# Patient Record
Sex: MALE | Race: WHITE | HISPANIC OR LATINO | Employment: STUDENT | URBAN - METROPOLITAN AREA
[De-identification: names, ages, dates, MRNs, and addresses within clinical notes are randomized per-mention and may not be internally consistent; named-entity substitution may affect disease eponyms.]

---

## 2022-08-10 ENCOUNTER — OFFICE VISIT (OUTPATIENT)
Dept: URGENT CARE | Facility: PHYSICIAN GROUP | Age: 22
End: 2022-08-10
Payer: COMMERCIAL

## 2022-08-10 ENCOUNTER — HOSPITAL ENCOUNTER (OUTPATIENT)
Dept: RADIOLOGY | Facility: MEDICAL CENTER | Age: 22
End: 2022-08-10
Attending: NURSE PRACTITIONER
Payer: COMMERCIAL

## 2022-08-10 VITALS
OXYGEN SATURATION: 96 % | SYSTOLIC BLOOD PRESSURE: 102 MMHG | HEART RATE: 68 BPM | RESPIRATION RATE: 12 BRPM | TEMPERATURE: 98 F | DIASTOLIC BLOOD PRESSURE: 66 MMHG | WEIGHT: 165 LBS | BODY MASS INDEX: 23.62 KG/M2 | HEIGHT: 70 IN

## 2022-08-10 DIAGNOSIS — S63.617A SPRAIN OF LEFT LITTLE FINGER, UNSPECIFIED SITE OF DIGIT, INITIAL ENCOUNTER: ICD-10-CM

## 2022-08-10 PROCEDURE — 73140 X-RAY EXAM OF FINGER(S): CPT | Mod: LT

## 2022-08-10 PROCEDURE — 99203 OFFICE O/P NEW LOW 30 MIN: CPT | Performed by: NURSE PRACTITIONER

## 2022-08-10 NOTE — PROGRESS NOTES
Chief Complaint   Patient presents with    Follow-Up     Left hand pinky, still swollen and got gareth all the way        HISTORY OF PRESENT ILLNESS: Patient is a pleasant 22 y.o. male who presents to urgent care today with concerns of a pinky injury.  Notes he jammed the finger while playing basketball 3 weeks ago.  Since then he has had pain and swelling, primarily to middle phalanx, DIP, and PIP area.  Pain exacerbated with range of motion.  Denies any numbness or tingling.  He is right-hand dominant.  He has tried using a splint for treatment.     There are no problems to display for this patient.      Allergies:Patient has no known allergies.    No current Epic-ordered outpatient medications on file.     No current [facility]-ordered facility-administered medications on file.       Past Medical History:   Diagnosis Date    No known health problems        Social History     Tobacco Use    Smoking status: Never    Smokeless tobacco: Never   Substance Use Topics    Alcohol use: No    Drug use: No       No family status information on file.   History reviewed. No pertinent family history.    ROS:  Review of Systems   Constitutional: Negative for fever, chills, weight loss, malaise, and fatigue.   HENT: Negative for ear pain, nosebleeds, congestion, sore throat and neck pain.    Eyes: Negative for vision changes.   Neuro: Negative for headache, sensory changes, weakness, seizure, LOC.   Cardiovascular: Negative for chest pain, palpitations, orthopnea and leg swelling.   Respiratory: Negative for cough, sputum production, shortness of breath and wheezing.   Gastrointestinal: Negative for abdominal pain, nausea, vomiting or diarrhea.   Genitourinary: Negative for dysuria, urgency and frequency.  Musculoskeletal: Positive for finger injury.  Negative for falls, neck pain, back pain, myalgias.   Skin: Negative for rash, diaphoresis.     Exam:  /66 (BP Location: Right arm, Patient Position: Sitting, BP Cuff Size: Adult)    "Pulse 68   Temp 36.7 °C (98 °F) (Temporal)   Resp 12   Ht 1.778 m (5' 10\")   Wt 74.8 kg (165 lb)   SpO2 96%   General: well-nourished, well-developed male in NAD  Head: normocephalic, atraumatic  Eyes: PERRLA, no conjunctival injection, acuity grossly intact, lids normal.  Ears: normal shape and symmetry, no tenderness, no discharge. External canals are without any significant edema or erythema. Tympanic membranes are without any inflammation, no effusion. Gross auditory acuity is intact.  Nose: symmetrical without tenderness, no discharge.  Mouth/Throat: reasonable hygiene, no erythema, exudates or tonsillar enlargement.  Neck: no masses, range of motion within normal limits, no tracheal deviation. No obvious thyroid enlargement.   Lymph: no cervical adenopathy. No supraclavicular adenopathy.   Neuro: alert and oriented. Cranial nerves 1-12 grossly intact. No sensory deficit.   Cardiovascular: regular rate and rhythm. No edema.  Pulmonary: no distress. Chest is symmetrical with respiration, no wheezes, crackles, or rhonchi.   Musculoskeletal: no clubbing, appropriate muscle tone, gait is stable.  Left fifth digit: Tenderness over middle phalanx.  Swelling noted over DIP and PIP.  Pain with flexion.  Neurovascular intact.  Skin: warm, dry, intact, no clubbing, no cyanosis, no rashes.   Psych: appropriate mood, affect, judgement.       DX finger radiology readin.  No acute displaced fracture is seen.  2.  Small curvilinear density at the volar aspect of the 5th PIP joint calcific density within tendinous or ligament attachment or potentially subacute bony injury. Origin is uncertain.        Assessment/Plan:  1. Sprain of left little finger, unspecified site of digit, initial encounter  DX-FINGER(S) 2+ LEFT            Patient presents with finger injury. X-ray abnormal with possible fracture and/or ligament injury. Patient placed in aluminum splint. OTC NSAIDS, RICE. Urgent referral placed to hand for follow " up.   Supportive care, differential diagnoses, and indications for immediate follow-up discussed with patient.   Pathogenesis of diagnosis discussed including typical length and natural progression.   Instructed to return to clinic or nearest emergency department for any change in condition, further concerns, or worsening of symptoms.  Patient states understanding of the plan of care and discharge instructions.  Instructed to make an appointment, for follow up, with his primary care provider.        Please note that this dictation was created using voice recognition software. I have made every reasonable attempt to correct obvious errors, but I expect that there are errors of grammar and possibly content that I did not discover before finalizing the note. Patient seen and evaluated by both myself and APRN student Coreen Ramírez.         AARON Rosas.

## 2022-08-24 PROBLEM — M25.642 STIFFNESS OF FINGER JOINT OF LEFT HAND: Status: ACTIVE | Noted: 2022-08-24

## 2022-08-24 PROBLEM — S63.637A SPRAIN OF INTERPHALANGEAL JOINT OF LEFT LITTLE FINGER: Status: ACTIVE | Noted: 2022-08-24

## 2024-03-18 ENCOUNTER — APPOINTMENT (OUTPATIENT)
Dept: RADIOLOGY | Facility: MEDICAL CENTER | Age: 24
End: 2024-03-18
Attending: EMERGENCY MEDICINE
Payer: COMMERCIAL

## 2024-03-18 ENCOUNTER — HOSPITAL ENCOUNTER (EMERGENCY)
Facility: MEDICAL CENTER | Age: 24
End: 2024-03-18
Attending: EMERGENCY MEDICINE
Payer: COMMERCIAL

## 2024-03-18 VITALS
BODY MASS INDEX: 23.62 KG/M2 | RESPIRATION RATE: 12 BRPM | HEART RATE: 87 BPM | OXYGEN SATURATION: 98 % | HEIGHT: 70 IN | WEIGHT: 165 LBS | DIASTOLIC BLOOD PRESSURE: 77 MMHG | TEMPERATURE: 98 F | SYSTOLIC BLOOD PRESSURE: 134 MMHG

## 2024-03-18 DIAGNOSIS — R07.81 RIB PAIN: ICD-10-CM

## 2024-03-18 DIAGNOSIS — T07.XXXA MULTIPLE ABRASIONS: ICD-10-CM

## 2024-03-18 DIAGNOSIS — R10.11 RUQ PAIN: ICD-10-CM

## 2024-03-18 DIAGNOSIS — R07.89 CHEST WALL PAIN: ICD-10-CM
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
## 2024-03-18 LAB
ABO + RH BLD: NORMAL
ABO GROUP BLD: NORMAL
ALBUMIN SERPL BCP-MCNC: 5.1 G/DL (ref 3.2–4.9)
ALBUMIN/GLOB SERPL: 1.8 G/DL
ALP SERPL-CCNC: 122 U/L (ref 30–99)
ALT SERPL-CCNC: 116 U/L (ref 2–50)
ANION GAP SERPL CALC-SCNC: 13 MMOL/L (ref 7–16)
APTT PPP: 25.2 SEC (ref 24.7–36)
AST SERPL-CCNC: 163 U/L (ref 12–45)
BILIRUB SERPL-MCNC: 0.4 MG/DL (ref 0.1–1.5)
BLD GP AB SCN SERPL QL: NORMAL
BUN SERPL-MCNC: 24 MG/DL (ref 8–22)
CALCIUM ALBUM COR SERPL-MCNC: 8.7 MG/DL (ref 8.5–10.5)
CALCIUM SERPL-MCNC: 9.6 MG/DL (ref 8.5–10.5)
CHLORIDE SERPL-SCNC: 103 MMOL/L (ref 96–112)
CO2 SERPL-SCNC: 23 MMOL/L (ref 20–33)
CREAT SERPL-MCNC: 1.14 MG/DL (ref 0.5–1.4)
ERYTHROCYTE [DISTWIDTH] IN BLOOD BY AUTOMATED COUNT: 39.6 FL (ref 35.9–50)
ETHANOL BLD-MCNC: <10.1 MG/DL
GFR SERPLBLD CREATININE-BSD FMLA CKD-EPI: 91 ML/MIN/1.73 M 2
GLOBULIN SER CALC-MCNC: 2.9 G/DL (ref 1.9–3.5)
GLUCOSE SERPL-MCNC: 107 MG/DL (ref 65–99)
HCG SERPL QL: NEGATIVE
HCT VFR BLD AUTO: 47.4 % (ref 42–52)
HGB BLD-MCNC: 17 G/DL (ref 14–18)
INR PPP: 1 (ref 0.87–1.13)
MCH RBC QN AUTO: 31.7 PG (ref 27–33)
MCHC RBC AUTO-ENTMCNC: 35.9 G/DL (ref 32.3–36.5)
MCV RBC AUTO: 88.4 FL (ref 81.4–97.8)
PLATELET # BLD AUTO: 243 K/UL (ref 164–446)
PMV BLD AUTO: 10.2 FL (ref 9–12.9)
POTASSIUM SERPL-SCNC: 3.8 MMOL/L (ref 3.6–5.5)
PROT SERPL-MCNC: 8 G/DL (ref 6–8.2)
PROTHROMBIN TIME: 13.3 SEC (ref 12–14.6)
RBC # BLD AUTO: 5.36 M/UL (ref 4.7–6.1)
RH BLD: NORMAL
SODIUM SERPL-SCNC: 139 MMOL/L (ref 135–145)
WBC # BLD AUTO: 13.7 K/UL (ref 4.8–10.8)

## 2024-03-18 PROCEDURE — 82077 ASSAY SPEC XCP UR&BREATH IA: CPT

## 2024-03-18 PROCEDURE — 86900 BLOOD TYPING SEROLOGIC ABO: CPT

## 2024-03-18 PROCEDURE — 80053 COMPREHEN METABOLIC PANEL: CPT

## 2024-03-18 PROCEDURE — 86901 BLOOD TYPING SEROLOGIC RH(D): CPT

## 2024-03-18 PROCEDURE — 73030 X-RAY EXAM OF SHOULDER: CPT | Mod: LT

## 2024-03-18 PROCEDURE — 90471 IMMUNIZATION ADMIN: CPT

## 2024-03-18 PROCEDURE — 700102 HCHG RX REV CODE 250 W/ 637 OVERRIDE(OP): Performed by: EMERGENCY MEDICINE

## 2024-03-18 PROCEDURE — 90715 TDAP VACCINE 7 YRS/> IM: CPT | Performed by: EMERGENCY MEDICINE

## 2024-03-18 PROCEDURE — 99284 EMERGENCY DEPT VISIT MOD MDM: CPT

## 2024-03-18 PROCEDURE — 85610 PROTHROMBIN TIME: CPT

## 2024-03-18 PROCEDURE — 36415 COLL VENOUS BLD VENIPUNCTURE: CPT

## 2024-03-18 PROCEDURE — 307740 HCHG GREEN TRAUMA TEAM SERVICES

## 2024-03-18 PROCEDURE — 85730 THROMBOPLASTIN TIME PARTIAL: CPT

## 2024-03-18 PROCEDURE — 71045 X-RAY EXAM CHEST 1 VIEW: CPT

## 2024-03-18 PROCEDURE — 85027 COMPLETE CBC AUTOMATED: CPT

## 2024-03-18 PROCEDURE — A9270 NON-COVERED ITEM OR SERVICE: HCPCS | Performed by: EMERGENCY MEDICINE

## 2024-03-18 PROCEDURE — 700117 HCHG RX CONTRAST REV CODE 255: Performed by: EMERGENCY MEDICINE

## 2024-03-18 PROCEDURE — 700111 HCHG RX REV CODE 636 W/ 250 OVERRIDE (IP): Performed by: EMERGENCY MEDICINE

## 2024-03-18 PROCEDURE — 74177 CT ABD & PELVIS W/CONTRAST: CPT

## 2024-03-18 PROCEDURE — 86850 RBC ANTIBODY SCREEN: CPT

## 2024-03-18 PROCEDURE — 84703 CHORIONIC GONADOTROPIN ASSAY: CPT

## 2024-03-18 RX ORDER — ACETAMINOPHEN 500 MG
1000 TABLET ORAL ONCE
Status: COMPLETED | OUTPATIENT
Start: 2024-03-18 | End: 2024-03-18

## 2024-03-18 RX ADMIN — IOHEXOL 95 ML: 350 INJECTION, SOLUTION INTRAVENOUS at 01:15

## 2024-03-18 RX ADMIN — CLOSTRIDIUM TETANI TOXOID ANTIGEN (FORMALDEHYDE INACTIVATED), CORYNEBACTERIUM DIPHTHERIAE TOXOID ANTIGEN (FORMALDEHYDE INACTIVATED), BORDETELLA PERTUSSIS TOXOID ANTIGEN (GLUTARALDEHYDE INACTIVATED), BORDETELLA PERTUSSIS FILAMENTOUS HEMAGGLUTININ ANTIGEN (FORMALDEHYDE INACTIVATED), BORDETELLA PERTUSSIS PERTACTIN ANTIGEN, AND BORDETELLA PERTUSSIS FIMBRIAE 2/3 ANTIGEN 0.5 ML: 5; 2; 2.5; 5; 3; 5 INJECTION, SUSPENSION INTRAMUSCULAR at 01:39

## 2024-03-18 RX ADMIN — ACETAMINOPHEN 1000 MG: 500 TABLET, FILM COATED ORAL at 01:38

## 2024-03-18 ASSESSMENT — PAIN DESCRIPTION - PAIN TYPE
TYPE: ACUTE PAIN
TYPE: ACUTE PAIN

## 2024-03-18 NOTE — ED NOTES
Bedside report received from off going RN/tech: Yanelis, assumed care of patient.  POC discussed with patient. Call light within reach, all needs addressed at this time.       Fall risk interventions in place: Move the patient closer to the nurse's station, Patient's personal possessions are with in their safe reach, Place fall risk sign on patient's door, and Keep floor surfaces clean and dry (all applicable per [place] Fall risk assessment)   Continuous monitoring: Pulse Ox or Blood Pressure  IVF/IV medications: Not Applicable   Oxygen: Room Air  Bedside sitter: Not Applicable   Isolation: Not Applicable
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
## 2024-05-07 ENCOUNTER — HOSPITAL ENCOUNTER (EMERGENCY)
Facility: MEDICAL CENTER | Age: 24
End: 2024-05-07
Attending: EMERGENCY MEDICINE
Payer: COMMERCIAL

## 2024-05-07 VITALS
HEART RATE: 66 BPM | DIASTOLIC BLOOD PRESSURE: 63 MMHG | OXYGEN SATURATION: 99 % | RESPIRATION RATE: 20 BRPM | HEIGHT: 70 IN | SYSTOLIC BLOOD PRESSURE: 127 MMHG | WEIGHT: 182.32 LBS | BODY MASS INDEX: 26.1 KG/M2 | TEMPERATURE: 97.6 F

## 2024-05-07 DIAGNOSIS — N34.2 URETHRITIS: ICD-10-CM

## 2024-05-07 LAB
APPEARANCE UR: CLEAR
BACTERIA #/AREA URNS HPF: NEGATIVE /HPF
BILIRUB UR QL STRIP.AUTO: NEGATIVE
COLOR UR: YELLOW
EPI CELLS #/AREA URNS HPF: NEGATIVE /HPF
GLUCOSE UR STRIP.AUTO-MCNC: NEGATIVE MG/DL
HYALINE CASTS #/AREA URNS LPF: ABNORMAL /LPF
KETONES UR STRIP.AUTO-MCNC: NEGATIVE MG/DL
LEUKOCYTE ESTERASE UR QL STRIP.AUTO: ABNORMAL
MICRO URNS: ABNORMAL
NITRITE UR QL STRIP.AUTO: NEGATIVE
PH UR STRIP.AUTO: 8 [PH] (ref 5–8)
PROT UR QL STRIP: NEGATIVE MG/DL
RBC # URNS HPF: ABNORMAL /HPF
RBC UR QL AUTO: ABNORMAL
SP GR UR STRIP.AUTO: 1.01
UROBILINOGEN UR STRIP.AUTO-MCNC: 0.2 MG/DL
WBC #/AREA URNS HPF: ABNORMAL /HPF

## 2024-05-07 RX ORDER — DOXYCYCLINE 100 MG/1
100 TABLET ORAL ONCE
Status: COMPLETED | OUTPATIENT
Start: 2024-05-07 | End: 2024-05-07

## 2024-05-07 RX ORDER — DOXYCYCLINE 100 MG/1
100 CAPSULE ORAL 2 TIMES DAILY
Qty: 20 CAPSULE | Refills: 0 | Status: ACTIVE | OUTPATIENT
Start: 2024-05-07 | End: 2024-05-17

## 2024-05-07 RX ORDER — CEFTRIAXONE 500 MG/1
500 INJECTION, POWDER, FOR SOLUTION INTRAMUSCULAR; INTRAVENOUS ONCE
Status: COMPLETED | OUTPATIENT
Start: 2024-05-07 | End: 2024-05-07

## 2024-05-07 RX ADMIN — DOXYCYCLINE 100 MG: 100 TABLET, FILM COATED ORAL at 11:02

## 2024-05-07 RX ADMIN — LIDOCAINE HYDROCHLORIDE 2 ML: 10 INJECTION, SOLUTION EPIDURAL; INFILTRATION; INTRACAUDAL at 11:02

## 2024-05-07 RX ADMIN — CEFTRIAXONE SODIUM 500 MG: 500 INJECTION, POWDER, FOR SOLUTION INTRAMUSCULAR; INTRAVENOUS at 11:02

## 2024-05-07 ASSESSMENT — FIBROSIS 4 INDEX: FIB4 SCORE: 1.56

## 2024-05-07 NOTE — LETTER
5/11/2024               Ivan Ortega  [id number]  [facility] [address]        Dear Ivan (MR#[id number])    As we have been unable to contact you by phone, this letter is sent in regards to your, recent visit to the [facility] Emergency Department on 5/7/2024. During the visit, tests were performed to assist the physician in your medical diagnosis. A review of your tests requires that we notify you of the following:    Your culture test was POSITIVE for Gonorrhea, a sexually transmitted infection. This was already treated appropriately in the Emergency Department. .      Based on the above findings it is recommended that you seek testing for the presence of additional sexually transmitted infections, hepatitis, and HIV from the Health Department. Also, it is advised that you inform your sexual partner(s) within the previous 60 days of the above findings and direct them to the Health Department for testing, and to abstain from sexual intercourse seven days after the completion of antibiotic treatment. Should your symptoms progress, it is important that you follow up with your primary care physician, your local urgent care office, or return to the emergency department for further work up in order to prevent long term health issues.      Additionally, patients who are HIV negative and have been diagnosed with a sexually transmitted infection (STI) in the past 6 months are considered for PrEP.  PrEP stands for Pre-Exposure Prophylaxis. It is a once-daily pill regimen that can help you stay HIV-negative. When taken as prescribed, PrEP has been shown to be safe and highly effective against alex HIV. While PrEP does not protect against other sexually transmitted infections or unwanted pregnancy, it can be paired with condoms and several other prevention strategies for additional protection. We have a clinic here in [facility] that can help you obtain this medication, if interested please contact the number  below.       Thank you for your cooperation in the matter.    Sincerely,  ED Culture Follow-Up Staff  Jose Bautista, Gucci    [facility], Emergency Department  [address] [place] [id number] [phone] (ED Culture Line)

## 2024-05-07 NOTE — ED PROVIDER NOTES
"ED Provider Note    CHIEF COMPLAINT  Chief Complaint   Patient presents with    Blood in Urine     Pt states noticed at the end of urinating today that 'there were a few drops of blood'   Denies any urinary discomfort or abdominal pain.       Other     Also endorses 'drops of sperm leaking out throughout the day' since Wednesday last week      HPI/ROS    Ivan Ortega is a 24 y.o. male who presents with hematuria and penile drainage.  The patient states over the last couple of days he is noted a little bit of pain with urination is also noted some blood in his urine.  He states he also has some substance that looks like sperm leaking out of the tip of his penis.  He does not have any testicular pain.  Does not have any abdominal pain.  He has not had any associated fevers.    PAST MEDICAL HISTORY   has a past medical history of No known health problems.    SURGICAL HISTORY  patient denies any surgical history    FAMILY HISTORY  No family history on file.    SOCIAL HISTORY  Social History     Tobacco Use    Smoking status: Never    Smokeless tobacco: Never   Vaping Use    Vaping Use: Every day   Substance and Sexual Activity    Alcohol use: Yes     Comment: occa    Drug use: Not Currently    Sexual activity: Not on file       CURRENT MEDICATIONS  Home Medications       Reviewed by Emely Luna R.N. (Registered Nurse) on 05/07/24 at 0757  Med List Status: Partial     Medication Last Dose Status        Patient Beto Taking any Medications                           ALLERGIES  No Known Allergies    PHYSICAL EXAM  VITAL SIGNS: /63   Pulse 66   Temp 36.4 °C (97.6 °F) (Temporal)   Resp 20   Ht 1.778 m (5' 10\")   Wt 82.7 kg (182 lb 5.1 oz)   SpO2 99%   BMI 26.16 kg/m²    General the patient does not appear toxic    GI the patient's abdomen is soft  the patient is uncircumcised.  He does have purulent drainage from the urethral meatus.  He has no testicular tenderness.  Does not have any open lesions " on the penis nor does he have any adenopathy    GI abdomen is soft      COURSE & MEDICAL DECISION MAKING  This is a 24-year-old male who presents to the emergency department with urethritis.  The patient does not appear toxic.  His abdominal examination is benign.  He will receive Rocephin intramuscularly as well as doxycycline orally.  The patient is aware his partners need to be treated.  He will return if he is not asymptomatic in 7 to 10 days and sooner if worse.    I would like the patient to be rechecked in 2 to 3 weeks to make sure the hematuria clears..    FINAL DIAGNOSIS  Urethritis    Disposition  The patient will be discharged in stable condition       Electronically signed by: Jeferson Bravo M.D., 5/7/2024 10:20 AM

## 2024-05-07 NOTE — ED TRIAGE NOTES
Ivan Ortega  24 y.o. male  Chief Complaint   Patient presents with    Blood in Urine     Pt states noticed at the end of urinating today that 'there were a few drops of blood'   Denies any urinary discomfort or abdominal pain.       Other     Also endorses 'drops of sperm leaking out throughout the day' since Wednesday last week        Pt amb to triage with steady gait for above complaint.   Pt is alert and oriented, speaking in full sentences, follows commands and responds appropriately to questions. Not in any apparent distress. Respirations are even and unlabored.  Pt placed in lobby. Pt educated on triage process. Pt encouraged to alert staff for any changes.

## 2024-05-07 NOTE — ED NOTES
Discharge instructions including the importance of hydration, the use of OTC medications, information on 1. Urethritis     and the proper follow up recommendations have been provided. Verbalizes understanding.  Confirms all questions have been answered.  A copy of the discharge instructions have been provided.  A signed copy is in the chart.  All pertinent medications reviewed.   Child out of department; pt in NAD, awake, alert, interactive and age appropriate

## 2024-05-09 LAB
BACTERIA UR CULT: ABNORMAL
BACTERIA UR CULT: ABNORMAL
SIGNIFICANT IND 70042: ABNORMAL
SITE SITE: ABNORMAL
SOURCE SOURCE: ABNORMAL

## 2024-05-11 NOTE — ED NOTES
"ED Positive Culture Follow-up/Notification Note:    Date: 5/11/24     Patient seen in the ED on 5/7/2024 for evaluation of hematuria and penile drainage. Per ED provider note, \"The patient states over the last couple of days he is noted a little bit of pain with urination is also noted some blood in his urine. He states he also has some substance that looks like sperm leaking out of the tip of his penis. He does not have any testicular pain. Does not have any abdominal pain. He has not had any associated fevers.\"  1. Urethritis       Discharge Medication List as of 5/7/2024 10:38 AM        START taking these medications    Details   doxycycline (MONODOX) 100 MG capsule Take 1 Capsule by mouth 2 times a day for 10 days., Disp-20 Capsule, R-0, Normal             Allergies: Patient has no known allergies.     Vitals:    05/07/24 0741 05/07/24 0752   BP: 127/63    Pulse: 66    Resp: 20    Temp: 36.4 °C (97.6 °F)    TempSrc: Temporal    SpO2: 99%    Weight:  82.7 kg (182 lb 5.1 oz)   Height:  1.778 m (5' 10\")       Final cultures:   Results       Procedure Component Value Units Date/Time    URINE CULTURE(NEW) [[id number]]  (Abnormal) Collected: 05/07/24 0848    Order Status: Completed Specimen: Urine Updated: 05/09/24 1459     Significant Indicator POS     Source UR     Site -     Culture Result -      Neisseria gonorrhoeae  >100,000 cfu/mL      Urinalysis, Culture if Indicated [[id number]]  (Abnormal) Collected: 05/07/24 0848    Order Status: Completed Specimen: Urine, Clean Catch Updated: 05/07/24 0920     Color Yellow     Character Clear     Specific Gravity 1.013     Ph 8.0     Glucose Negative mg/dL      Ketones Negative mg/dL      Protein Negative mg/dL      Bilirubin Negative     Urobilinogen, Urine 0.2     Nitrite Negative     Leukocyte Esterase Moderate     Occult Blood Small     Micro Urine Req Microscopic            Plan:   Patient treated for gonorrhea in the ER. No additional treatment necessary.   Left " voicemail for patient. Will send letter to  the patient to abstain from sexual intercourse 7 days after antibiotic therapy is completed, to notify any sexual partners within the last 60 days to go the the Health Department for testing, to seek further HIV/STD testing, and to seek medical attention if symptoms persist.   Jose Bautista, PharmD

## 2024-09-10 ENCOUNTER — HOSPITAL ENCOUNTER (EMERGENCY)
Facility: MEDICAL CENTER | Age: 24
End: 2024-09-10
Attending: EMERGENCY MEDICINE
Payer: COMMERCIAL

## 2024-09-10 VITALS
RESPIRATION RATE: 16 BRPM | WEIGHT: 172.4 LBS | DIASTOLIC BLOOD PRESSURE: 60 MMHG | TEMPERATURE: 98.1 F | BODY MASS INDEX: 25.53 KG/M2 | HEART RATE: 64 BPM | HEIGHT: 69 IN | SYSTOLIC BLOOD PRESSURE: 110 MMHG | OXYGEN SATURATION: 99 %

## 2024-09-10 DIAGNOSIS — R51.9 ACUTE NONINTRACTABLE HEADACHE, UNSPECIFIED HEADACHE TYPE: ICD-10-CM

## 2024-09-10 PROCEDURE — 99282 EMERGENCY DEPT VISIT SF MDM: CPT

## 2024-09-10 ASSESSMENT — FIBROSIS 4 INDEX: FIB4 SCORE: 1.56

## 2024-09-10 NOTE — ED PROVIDER NOTES
"ED Provider Note        CHIEF COMPLAINT  Chief Complaint   Patient presents with    Headache     Migraine headache lasting approximately 2 hours. Symptoms resolved with no medication, denies hx migraines, no headache now. Here for work note.         HPI    Ivan Ortega is a 24 y.o. male who presents to the Emergency Department with a headache.  The patient awoke with a headache that is bitemporal this morning.  He drank water and went back to sleep and it has markedly improved.  He reports only the slightest residual minimal headache worse with sounds.  He does not have a history of headaches.  Denies any vomiting, fevers, neck stiffness, any other complaints.    REVIEW OF SYSTEMS  See HPI for further details. All other systems are negative.     PAST MEDICAL HISTORY     Past Medical History:   Diagnosis Date    No known health problems        SURGICAL HISTORY  History reviewed. No pertinent surgical history.    FAMILY HISTORY  History reviewed. No pertinent family history.    SOCIAL HISTORY    reports that he has never smoked. He has never used smokeless tobacco. He reports current alcohol use. He reports current drug use. Drug: Inhaled.    CURRENT MEDICATIONS  Home Medications       Reviewed by Kyrie Stockton R.N. (Registered Nurse) on 09/10/24 at 1056  Med List Status: Partial     Medication Last Dose Status        Patient Beto Taking any Medications                           ALLERGIES  No Known Allergies    PHYSICAL EXAM  VITAL SIGNS: /50   Pulse 60   Temp 36.7 °C (98.1 °F) (Temporal)   Resp 16   Ht 1.753 m (5' 9\")   Wt 78.2 kg (172 lb 6.4 oz)   SpO2 98%   BMI 25.46 kg/m²   Gen: Alert, no acute distress  HEENT: ATNC normal oropharynx  Eyes: PERRL, EOMI, normal conjunctiva  Neck: trachea midline no meningismus  Resp: no respiratory distress  CV: No JVD, regular rate and rhythm  Abd: non-distended  Ext: No deformities  Neuro: speech fluent, cranial 2 through 12 intact, GCS 15, normal " cerebellar function          COURSE & MEDICAL DECISION MAKING  Pertinent Labs & Imaging studies were reviewed. (See chart for details)      INITIAL ASSESSMENT AND PLAN  Care Narrative: Patient presents with a headache that he woke up with this morning, which has essentially resolved.  Is bitemporal, most likely tension type headache.  Cranial nerves II through XII intact, no high risk features that would suggest venous sinus thrombosis, subarachnoid hemorrhage.  There is no meningismus to suggest meningitis.  Low suspicion for intracranial mass.  Low suspicion for IN ES, RCVS.  No hypertension to suggest hypertensive encephalopathy.  Patient requesting work note which will be provided    ADDITIONAL PROBLEM LIST AND DISPOSITION    Escalation of care considered, and ultimately not performed: Laboratory analysis and diagnostic imaging.     Barriers to care at this time, including but not limited to: Patient does not have established PCP.     Decision tools and prescription drugs considered including, but not limited to: Pain Medications recommend on opioid over-the-counter pain medications .        Patient is referred to primary care provider for blood pressure, diabetes and all other preventative health services.  Patient was given return precautions, anticipatory guidance, and the opportunity ask questions prior to discharge        FINAL IMPRESSION  1. Acute nonintractable headache, unspecified headache type           DISPOSITION:  Patient will be discharged home in stable condition.    FOLLOW UP:  [facility], Emergency Dept  [address] [address] [phone]    If symptoms worsen    Your regular doctor.  To establish a primary care provider within our system, please call [phone]                   This dictation was created using voice recognition software. The accuracy of the dictation is limited to the abilities of the software. I expect there may be some errors of  grammar and possibly content. The nursing notes were reviewed and certain aspects of this information were incorporated into this note.

## 2024-09-10 NOTE — Clinical Note
Ivan Ortega was seen and treated in our emergency department on 9/10/2024.  He may return to work on 09/11/2024.       If you have any questions or concerns, please don't hesitate to call.      Henrry Noonan M.D.

## 2024-09-10 NOTE — ED TRIAGE NOTES
Ivan Loyamansi Ortega  24 y.o. male  Chief Complaint   Patient presents with    Headache     Migraine headache lasting approximately 2 hours. Symptoms resolved with no medication, denies hx migraines, no headache now. Here for work note.       Pt ambulatory to triage with steady gait for above complaint.     Pt is GCS 15, speaking in full sentences, follows commands and responds appropriately to questions. Resp are even and unlabored.      Pt placed in lobby. Pt educated on triage process. Pt encouraged to alert staff for any changes.       Vitals:    09/10/24 1024   BP: 112/50   Pulse: 60   Resp: 16   Temp: 36.7 °C (98.1 °F)   SpO2: 98%

## 2024-09-14 ENCOUNTER — APPOINTMENT (OUTPATIENT)
Dept: RADIOLOGY | Facility: MEDICAL CENTER | Age: 24
End: 2024-09-14
Attending: EMERGENCY MEDICINE

## 2024-09-14 ENCOUNTER — HOSPITAL ENCOUNTER (EMERGENCY)
Facility: MEDICAL CENTER | Age: 24
End: 2024-09-14
Attending: EMERGENCY MEDICINE

## 2024-09-14 VITALS
TEMPERATURE: 98.7 F | BODY MASS INDEX: 24.48 KG/M2 | RESPIRATION RATE: 18 BRPM | SYSTOLIC BLOOD PRESSURE: 116 MMHG | HEIGHT: 70 IN | DIASTOLIC BLOOD PRESSURE: 71 MMHG | OXYGEN SATURATION: 97 % | WEIGHT: 171 LBS | HEART RATE: 77 BPM

## 2024-09-14 DIAGNOSIS — E83.51 HYPOCALCEMIA: ICD-10-CM

## 2024-09-14 DIAGNOSIS — Z00.8 MEDICAL CLEARANCE FOR INCARCERATION: ICD-10-CM

## 2024-09-14 DIAGNOSIS — S80.12XA CONTUSION OF LEFT LOWER LEG, INITIAL ENCOUNTER: ICD-10-CM

## 2024-09-14 DIAGNOSIS — F10.920 ALCOHOLIC INTOXICATION WITHOUT COMPLICATION (HCC): ICD-10-CM

## 2024-09-14 DIAGNOSIS — S20.212A CHEST WALL CONTUSION, LEFT, INITIAL ENCOUNTER: ICD-10-CM
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
## 2024-09-14 LAB
ABO GROUP BLD: NORMAL
ALBUMIN SERPL BCP-MCNC: 4.8 G/DL (ref 3.2–4.9)
ALBUMIN/GLOB SERPL: 1.8 G/DL
ALP SERPL-CCNC: 80 U/L (ref 30–99)
ALT SERPL-CCNC: 22 U/L (ref 2–50)
ANION GAP SERPL CALC-SCNC: 11 MMOL/L (ref 7–16)
APTT PPP: 27.5 SEC (ref 24.7–36)
AST SERPL-CCNC: 28 U/L (ref 12–45)
BILIRUB SERPL-MCNC: 0.7 MG/DL (ref 0.1–1.5)
BLD GP AB SCN SERPL QL: NORMAL
BUN SERPL-MCNC: 13 MG/DL (ref 8–22)
CALCIUM ALBUM COR SERPL-MCNC: 8.3 MG/DL (ref 8.5–10.5)
CALCIUM SERPL-MCNC: 8.9 MG/DL (ref 8.5–10.5)
CHLORIDE SERPL-SCNC: 106 MMOL/L (ref 96–112)
CO2 SERPL-SCNC: 24 MMOL/L (ref 20–33)
CREAT SERPL-MCNC: 1.13 MG/DL (ref 0.5–1.4)
ERYTHROCYTE [DISTWIDTH] IN BLOOD BY AUTOMATED COUNT: 40.4 FL (ref 35.9–50)
ETHANOL BLD-MCNC: 163 MG/DL
GFR SERPLBLD CREATININE-BSD FMLA CKD-EPI: 92 ML/MIN/1.73 M 2
GLOBULIN SER CALC-MCNC: 2.7 G/DL (ref 1.9–3.5)
GLUCOSE SERPL-MCNC: 100 MG/DL (ref 65–99)
HCT VFR BLD AUTO: 44.8 % (ref 42–52)
HGB BLD-MCNC: 15.6 G/DL (ref 14–18)
INR PPP: 1.1 (ref 0.87–1.13)
MCH RBC QN AUTO: 31.5 PG (ref 27–33)
MCHC RBC AUTO-ENTMCNC: 34.8 G/DL (ref 32.3–36.5)
MCV RBC AUTO: 90.3 FL (ref 81.4–97.8)
PLATELET # BLD AUTO: 265 K/UL (ref 164–446)
PMV BLD AUTO: 9.9 FL (ref 9–12.9)
POTASSIUM SERPL-SCNC: 4 MMOL/L (ref 3.6–5.5)
PROT SERPL-MCNC: 7.5 G/DL (ref 6–8.2)
PROTHROMBIN TIME: 14.4 SEC (ref 12–14.6)
RBC # BLD AUTO: 4.96 M/UL (ref 4.7–6.1)
RH BLD: NORMAL
SODIUM SERPL-SCNC: 141 MMOL/L (ref 135–145)
WBC # BLD AUTO: 4.7 K/UL (ref 4.8–10.8)

## 2024-09-14 PROCEDURE — 73590 X-RAY EXAM OF LOWER LEG: CPT | Mod: LT

## 2024-09-14 PROCEDURE — 86850 RBC ANTIBODY SCREEN: CPT

## 2024-09-14 PROCEDURE — 72125 CT NECK SPINE W/O DYE: CPT

## 2024-09-14 PROCEDURE — 71045 X-RAY EXAM CHEST 1 VIEW: CPT

## 2024-09-14 PROCEDURE — 85610 PROTHROMBIN TIME: CPT

## 2024-09-14 PROCEDURE — 86900 BLOOD TYPING SEROLOGIC ABO: CPT

## 2024-09-14 PROCEDURE — 86901 BLOOD TYPING SEROLOGIC RH(D): CPT

## 2024-09-14 PROCEDURE — 700117 HCHG RX CONTRAST REV CODE 255: Performed by: EMERGENCY MEDICINE

## 2024-09-14 PROCEDURE — 99285 EMERGENCY DEPT VISIT HI MDM: CPT

## 2024-09-14 PROCEDURE — 70450 CT HEAD/BRAIN W/O DYE: CPT

## 2024-09-14 PROCEDURE — 85027 COMPLETE CBC AUTOMATED: CPT

## 2024-09-14 PROCEDURE — 85730 THROMBOPLASTIN TIME PARTIAL: CPT

## 2024-09-14 PROCEDURE — 80053 COMPREHEN METABOLIC PANEL: CPT

## 2024-09-14 PROCEDURE — 72131 CT LUMBAR SPINE W/O DYE: CPT

## 2024-09-14 PROCEDURE — 307740 HCHG GREEN TRAUMA TEAM SERVICES

## 2024-09-14 PROCEDURE — 71260 CT THORAX DX C+: CPT

## 2024-09-14 PROCEDURE — 82077 ASSAY SPEC XCP UR&BREATH IA: CPT

## 2024-09-14 PROCEDURE — 36415 COLL VENOUS BLD VENIPUNCTURE: CPT

## 2024-09-14 PROCEDURE — 72128 CT CHEST SPINE W/O DYE: CPT

## 2024-09-14 RX ADMIN — IOHEXOL 100 ML: 350 INJECTION, SOLUTION INTRAVENOUS at 06:30
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
## 2024-09-14 NOTE — DISCHARGE INSTRUCTIONS
Medically clear for incarceration    Take Tums/calcium carbonate to supplement your calcium which was found to be low    Return to the ER for chest pain, difficulty breathing, or other concern    Follow-up with a primary care provider for recheck of your calcium    Do not drink alcohol and drive a vehicle

## 2024-09-14 NOTE — ED NOTES
Bedside report received from off going RN/tech: April, RN, assumed care of patient.  POC discussed with patient. Call light within reach, all needs addressed at this time.       Fall risk interventions in place: Not Applicable (all applicable per [place] Fall risk assessment)   Continuous monitoring: Cardiac Leads, Pulse Ox, or Blood Pressure  IVF/IV medications: Not Applicable   Oxygen: Room Air  Bedside sitter: Not Applicable   Isolation: Not Applicable

## 2024-09-14 NOTE — ED NOTES
Pt has discharge orders. Pt educated on discharge instructions.  Pt verbalizes understanding.  PIV removed. Pt cleared for incarceration.  Pt ambulatory with steady gait with [employer] to ambulance bay.
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
